# Patient Record
Sex: FEMALE | Race: WHITE | URBAN - METROPOLITAN AREA
[De-identification: names, ages, dates, MRNs, and addresses within clinical notes are randomized per-mention and may not be internally consistent; named-entity substitution may affect disease eponyms.]

---

## 2020-07-25 ENCOUNTER — TELEPHONE ENCOUNTER (OUTPATIENT)
Dept: URBAN - METROPOLITAN AREA CLINIC 13 | Facility: CLINIC | Age: 85
End: 2020-07-25

## 2020-07-26 ENCOUNTER — TELEPHONE ENCOUNTER (OUTPATIENT)
Dept: URBAN - METROPOLITAN AREA CLINIC 13 | Facility: CLINIC | Age: 85
End: 2020-07-26

## 2021-06-22 ENCOUNTER — WEB ENCOUNTER (OUTPATIENT)
Dept: URBAN - METROPOLITAN AREA CLINIC 113 | Facility: CLINIC | Age: 86
End: 2021-06-22

## 2021-06-22 ENCOUNTER — OFFICE VISIT (OUTPATIENT)
Dept: URBAN - METROPOLITAN AREA CLINIC 113 | Facility: CLINIC | Age: 86
End: 2021-06-22
Payer: MEDICARE

## 2021-06-22 VITALS
DIASTOLIC BLOOD PRESSURE: 65 MMHG | HEART RATE: 67 BPM | SYSTOLIC BLOOD PRESSURE: 131 MMHG | WEIGHT: 142 LBS | HEIGHT: 62 IN | TEMPERATURE: 97.8 F | BODY MASS INDEX: 26.13 KG/M2

## 2021-06-22 DIAGNOSIS — D64.9 ANEMIA, UNSPECIFIED TYPE: ICD-10-CM

## 2021-06-22 DIAGNOSIS — K59.01 SLOW TRANSIT CONSTIPATION: ICD-10-CM

## 2021-06-22 DIAGNOSIS — R15.9 INCONTINENCE OF FECES: ICD-10-CM

## 2021-06-22 PROBLEM — 72042002 INCONTINENCE OF FECES: Status: ACTIVE | Noted: 2021-06-22

## 2021-06-22 PROCEDURE — 99203 OFFICE O/P NEW LOW 30 MIN: CPT | Performed by: INTERNAL MEDICINE

## 2021-06-22 RX ORDER — HYDROXYUREA 500 MG/1
1 CAPSULE CAPSULE ORAL ONCE A DAY
Status: ACTIVE | COMMUNITY

## 2021-06-22 RX ORDER — LEVOTHYROXINE SODIUM 0.05 MG/1
1 TABLET IN THE MORNING ON AN EMPTY STOMACH TABLET ORAL ONCE A DAY
Status: DISCONTINUED | COMMUNITY

## 2021-06-22 RX ORDER — RASAGILINE 0.5 MG/1
1 TABLET TABLET ORAL ONCE A DAY
Status: ACTIVE | COMMUNITY

## 2021-06-22 RX ORDER — ASPIRIN 81 MG/1
1 TABLET TABLET ORAL ONCE A DAY
Status: ACTIVE | COMMUNITY

## 2021-06-22 RX ORDER — MELATONIN 5 MG
1 TABLET IN THE EVENING TABLET ORAL ONCE A DAY
Status: ACTIVE | COMMUNITY

## 2021-06-22 RX ORDER — LEVOTHYROXINE SODIUM 25 UG/1
1 TABLET 62.5 MG TABLET ORAL ONCE A DAY
Status: ACTIVE | COMMUNITY

## 2021-06-22 RX ORDER — NAPROXEN SODIUM 220 MG/1
1 TABLET WITH FOOD OR MILK AS NEEDED TABLET ORAL
Status: ACTIVE | COMMUNITY

## 2021-06-22 RX ORDER — CARBIDOPA AND LEVODOPA 25; 100 MG/1; MG/1
1 TABLET PRN TABLET ORAL THREE TIMES A DAY
Status: ACTIVE | COMMUNITY

## 2021-06-22 RX ORDER — OMEGA-3 FATTY ACIDS/FISH OIL 300-1000MG
1 CAPSULE CAPSULE ORAL ONCE A DAY
Status: ACTIVE | COMMUNITY

## 2021-06-22 NOTE — HPI-TODAY'S VISIT:
85-year-old female with a history of Grave's disease, colon polyps, chronic constipation, referred by Dr. Dean for evaluation of anemia. Unfortunately, recent labs are not available for review.  She complains of constipation, going up to one week without a bowel movement. Her stools will be hard, then progress to soft and loose with associated urgency and incontinence. She will have three bowel movements in one day, then go another week without. She denies associated abdominal pain, nausea/vomiting, or unintentional weight loss. No blood per rectum. She takes MiraLAX on occasion, with minimal response. Milk of magnesia is helpful, but she only takes it every few weeks. She complains of incomplete evacuation. She reports a normal colonoscopy in 2016 by Dr. Pearson at [facility]. She takes Aleve twice daily for management of arthritis pain, along with aspirin 81mg daily.

## 2021-06-28 ENCOUNTER — TELEPHONE ENCOUNTER (OUTPATIENT)
Dept: URBAN - METROPOLITAN AREA CLINIC 113 | Facility: CLINIC | Age: 86
End: 2021-06-28

## 2021-07-23 ENCOUNTER — TELEPHONE ENCOUNTER (OUTPATIENT)
Dept: URBAN - METROPOLITAN AREA CLINIC 113 | Facility: CLINIC | Age: 86
End: 2021-07-23

## 2021-08-28 ENCOUNTER — LAB OUTSIDE AN ENCOUNTER (OUTPATIENT)
Dept: URBAN - METROPOLITAN AREA CLINIC 113 | Facility: CLINIC | Age: 86
End: 2021-08-28

## 2021-09-10 ENCOUNTER — OFFICE VISIT (OUTPATIENT)
Dept: URBAN - METROPOLITAN AREA CLINIC 113 | Facility: CLINIC | Age: 86
End: 2021-09-10

## 2021-10-05 ENCOUNTER — OFFICE VISIT (OUTPATIENT)
Dept: URBAN - METROPOLITAN AREA CLINIC 113 | Facility: CLINIC | Age: 86
End: 2021-10-05
Payer: MEDICARE

## 2021-10-05 VITALS
BODY MASS INDEX: 26.31 KG/M2 | HEIGHT: 62 IN | TEMPERATURE: 97.8 F | WEIGHT: 143 LBS | SYSTOLIC BLOOD PRESSURE: 142 MMHG | HEART RATE: 67 BPM | DIASTOLIC BLOOD PRESSURE: 67 MMHG

## 2021-10-05 DIAGNOSIS — K59.01 SLOW TRANSIT CONSTIPATION: ICD-10-CM

## 2021-10-05 DIAGNOSIS — D53.1 MEGALOBLASTIC ANEMIA: ICD-10-CM

## 2021-10-05 PROBLEM — 35298007 SLOW TRANSIT CONSTIPATION: Status: ACTIVE | Noted: 2021-06-22

## 2021-10-05 PROCEDURE — 99214 OFFICE O/P EST MOD 30 MIN: CPT | Performed by: INTERNAL MEDICINE

## 2021-10-05 RX ORDER — MELATONIN 5 MG
1 TABLET IN THE EVENING TABLET ORAL ONCE A DAY
Status: ON HOLD | COMMUNITY

## 2021-10-05 RX ORDER — NAPROXEN SODIUM 220 MG/1
1 TABLET WITH FOOD OR MILK AS NEEDED TABLET ORAL
Status: ACTIVE | COMMUNITY

## 2021-10-05 RX ORDER — ASPIRIN 81 MG/1
1 TABLET TABLET ORAL ONCE A DAY
Status: ACTIVE | COMMUNITY

## 2021-10-05 RX ORDER — LINACLOTIDE 72 UG/1
1 CAPSULE AT LEAST 30 MINUTES BEFORE THE FIRST MEAL OF THE DAY ON AN EMPTY STOMACH CAPSULE, GELATIN COATED ORAL ONCE A DAY
Status: ACTIVE | COMMUNITY

## 2021-10-05 RX ORDER — OMEGA-3 FATTY ACIDS/FISH OIL 300-1000MG
1 CAPSULE CAPSULE ORAL ONCE A DAY
Status: ACTIVE | COMMUNITY

## 2021-10-05 RX ORDER — LEVOTHYROXINE SODIUM 25 UG/1
1 TABLET 62.5 MG TABLET ORAL ONCE A DAY
Status: ACTIVE | COMMUNITY

## 2021-10-05 RX ORDER — CARBIDOPA AND LEVODOPA 25; 100 MG/1; MG/1
1 TABLET PRN TABLET ORAL THREE TIMES A DAY
Status: ACTIVE | COMMUNITY

## 2021-10-05 RX ORDER — RASAGILINE 0.5 MG/1
1 TABLET TABLET ORAL ONCE A DAY
Status: ACTIVE | COMMUNITY

## 2021-10-05 RX ORDER — HYDROXYUREA 500 MG/1
1 CAPSULE CAPSULE ORAL ONCE A DAY
Status: ACTIVE | COMMUNITY

## 2021-10-05 NOTE — HPI-TODAY'S VISIT:
85-year-old female with a history of Grave's disease, colon polyps, chronic constipation, referred by Dr. Dean for evaluation of anemia. Unfortunately, recent labs are not available for review.  She complains of constipation, going up to one week without a bowel movement. Her stools will be hard, then progress to soft and loose with associated urgency and incontinence. She will have three bowel movements in one day, then go another week without. She denies associated abdominal pain, nausea/vomiting, or unintentional weight loss. No blood per rectum. She takes MiraLAX on occasion, with minimal response. Milk of magnesia is helpful, but she only takes it every few weeks. She complains of incomplete evacuation. She reports a normal colonoscopy in 2016 by Dr. Pearson at [facility]. She takes Aleve twice daily for management of arthritis pain, along with aspirin 81mg daily. Laboratory testing done by primary care physician in August of this year revealed a normal T4, normal magnesium, BUN 27 creatinine 1.  Normal LFTs.  TSH normal.  CBC showing white cell count 3.4 hemoglobin 11  platelets of 370.  MCV is improved from prior MCV of 130.  Patient is followed by hematology for this. The patient states that she could not tolerate milk of magnesia.  She is now taking Linzess with some modest improvement.  She is on 72 mg.  She is not currently on a fiber supplement.  She is concerned that she states her primary care physician was concerned about her anemia and whether she needed endoscopic evaluation for this.  She has no rectal bleeding or melena.  Her hemoglobin is 11 with an MCV running between 120 and 130.  She is routinely followed by hematology infection has an appointment with them next week.  He had no reported history of iron deficiency.

## 2022-11-03 ENCOUNTER — OFFICE VISIT (OUTPATIENT)
Dept: URBAN - METROPOLITAN AREA CLINIC 113 | Facility: CLINIC | Age: 87
End: 2022-11-03
Payer: MEDICARE

## 2022-11-03 VITALS
BODY MASS INDEX: 26.68 KG/M2 | SYSTOLIC BLOOD PRESSURE: 147 MMHG | RESPIRATION RATE: 18 BRPM | TEMPERATURE: 97.5 F | HEART RATE: 71 BPM | HEIGHT: 62 IN | DIASTOLIC BLOOD PRESSURE: 65 MMHG | WEIGHT: 145 LBS

## 2022-11-03 DIAGNOSIS — K59.09 CHRONIC CONSTIPATION WITH OVERFLOW INCONTINENCE: ICD-10-CM

## 2022-11-03 DIAGNOSIS — K76.0 HEPATIC STEATOSIS: ICD-10-CM

## 2022-11-03 DIAGNOSIS — R31.9 HEMATURIA, UNSPECIFIED TYPE: ICD-10-CM

## 2022-11-03 DIAGNOSIS — D53.9 MACROCYTIC ANEMIA: ICD-10-CM

## 2022-11-03 PROCEDURE — 99214 OFFICE O/P EST MOD 30 MIN: CPT

## 2022-11-03 RX ORDER — LEVOTHYROXINE SODIUM 25 UG/1
1 TABLET 62.5 MG TABLET ORAL ONCE A DAY
Status: ACTIVE | COMMUNITY

## 2022-11-03 RX ORDER — CARBIDOPA AND LEVODOPA 25; 100 MG/1; MG/1
1 TABLET PRN TABLET ORAL THREE TIMES A DAY
Status: ACTIVE | COMMUNITY

## 2022-11-03 RX ORDER — LORATADINE 10 MG
1 PACKET MIXED WITH 8 OUNCES OF FLUID TABLET,DISINTEGRATING ORAL ONCE A DAY
Status: ACTIVE | COMMUNITY

## 2022-11-03 RX ORDER — NAPROXEN SODIUM 220 MG/1
1 TABLET WITH FOOD OR MILK AS NEEDED TABLET ORAL
Status: ON HOLD | COMMUNITY

## 2022-11-03 RX ORDER — LINACLOTIDE 72 UG/1
1 CAPSULE AT LEAST 30 MINUTES BEFORE THE FIRST MEAL OF THE DAY ON AN EMPTY STOMACH CAPSULE, GELATIN COATED ORAL ONCE A DAY
Status: ON HOLD | COMMUNITY

## 2022-11-03 RX ORDER — OMEGA-3 FATTY ACIDS/FISH OIL 300-1000MG
1 CAPSULE CAPSULE ORAL ONCE A DAY
Status: ACTIVE | COMMUNITY

## 2022-11-03 RX ORDER — RASAGILINE 0.5 MG/1
1 TABLET TABLET ORAL ONCE A DAY
Status: ACTIVE | COMMUNITY

## 2022-11-03 RX ORDER — MELATONIN 5 MG
1 TABLET IN THE EVENING TABLET ORAL ONCE A DAY
Status: ON HOLD | COMMUNITY

## 2022-11-03 RX ORDER — ASPIRIN 81 MG/1
1 TABLET TABLET ORAL ONCE A DAY
Status: ACTIVE | COMMUNITY

## 2022-11-03 RX ORDER — HYDROXYUREA 500 MG/1
1 CAPSULE CAPSULE ORAL ONCE A DAY
Status: ACTIVE | COMMUNITY

## 2022-11-03 RX ORDER — POLYETHYLENE GLYCOL 3350, SODIUM CHLORIDE, SODIUM BICARBONATE, POTASSIUM CHLORIDE 420; 11.2; 5.72; 1.48 G/4L; G/4L; G/4L; G/4L
AS DIRECTED POWDER, FOR SOLUTION ORAL ONCE
Qty: 420 GM | Refills: 0 | OUTPATIENT
Start: 2022-11-03 | End: 2022-12-02

## 2022-11-03 NOTE — HPI-OTHER HISTORIES
Abdominal ultrasound 7/22/2022:Normal pancreas, gallbladder, kidneys, spleen.  Hepatic steatosis.  Labs 9/23/2022:Elevated vitamin D 104, normal LFTs, normal ferritin, normal iron studies, normal T3, normal T4, normal TSH, hemoglobin 10.7, hematocrit 32.4, elevated .6, normal CK, normal hemoglobin A1c, negative JENNIFER, normal GGT, negative ASMA, negative AMA, normal PTH, positive hepatitis A antibody total, negative hepatitis A antibody IgM, negative hepatitis B surface antigen, negative hepatitis B surface antibody, negative hepatitis B core antibody total, negative hepatitis C antibody.  CT scan of the abdomen/pelvis with contrast 10/4/2022:Mild atelectasis and scarring at the lung bases.  Normal liver, gallbladder, pancreas, spleen.  No renal or ureteral stone.  No hydronephrosis.  Eccentric bladder wall thickening on the left measuring 0.8 cm.  Direct visualization recommended to evaluate for possible bladder mass given history of hematuria.  Normal colonoscopy by Dr. Cancino 11/13/2009.

## 2022-11-03 NOTE — HPI-TODAY'S VISIT:
85-year-old female with a history of Grave's disease, colon polyps, chronic constipation, referred by Dr. Dean for evaluation of anemia. Unfortunately, recent labs are not available for review.  She complains of constipation, going up to one week without a bowel movement. Her stools will be hard, then progress to soft and loose with associated urgency and incontinence. She will have three bowel movements in one day, then go another week without. She denies associated abdominal pain, nausea/vomiting, or unintentional weight loss. No blood per rectum. She takes MiraLAX on occasion, with minimal response. Milk of magnesia is helpful, but she only takes it every few weeks. She complains of incomplete evacuation. She reports a normal colonoscopy in 2016 by Dr. Pearson at [facility]. She takes Aleve twice daily for management of arthritis pain, along with aspirin 81 mg daily. Laboratory testing done by primary care physician in August of this year revealed a normal T4, normal magnesium, BUN 27 creatinine 1.  Normal LFTs.  TSH normal.  CBC showing white cell count 3.4 hemoglobin 11  platelets of 370.  MCV is improved from prior MCV of 130.  Patient is followed by hematology for this. The patient states that she could not tolerate milk of magnesia.  She is now taking Linzess with some modest improvement.  She is on 72 mg.  She is not currently on a fiber supplement.  She is concerned that she states her primary care physician was concerned about her anemia and whether she needed endoscopic evaluation for this.  She has no rectal bleeding or melena.  Her hemoglobin is 11 with an MCV running between 120 and 130.  She is routinely followed by hematology infection has an appointment with them next week.  He had no reported history of iron deficiency.  Interval history: 86-year-old female presents for long interval follow-up and evaluation of change in bowel habits.  She was last seen on 10/5/2021.  She was recommended to titrate MiraLAX and add a fiber supplement to her daily Linzess use for management of constipation.  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  If there is evidence of iron deficiency anemia we would consider EGD and colonoscopy however in view of her age, would prefer more definitive evidence for chronic GI blood loss.  Her hemoglobin was largely stable at 11.  If she had decreasing hemoglobin or other GI signs such as melena or hematochezia or symptoms of iron deficiency we would then need to proceed with endoscopic evaluation.  We would continue her routine follow-up and monitor information sent by her hematologist and PCP looking for signs and symptoms of chronic GI blood loss. We do not have any updated labs since last visit.  She states her bowel habits were fairly controlled with Linzess however the secondary diarrhea became too bothersome. She would have urgent diarrhea while out in public. She was taken off of Linzess by her PCP and started on MiraLAX and Colace twice daily. This was working well for some time however her bowels have reverted to her prior state. She will go a week without a bowel movement then she will have a day of diarrhea. She describes this as a day of several soft bowel movements throughout the day. She had to consume prunes last week to have a bowel movement. Her last bowel movement was over the last weekend. SHe does have Parkinson disease. She fell a few wee ska ago due to her issues with balance. She has a large bruise on the left side of her face. She had no damage to her vision.   She denies rectal bleeding though she has had hematuria. She was referred to Dr. Doshi, urology in [place]. She had a cystoscopy which was unremarkable. She has now been referred to gynecology. She does follow with a cardiologist as well. Recent cardiac evaluation was unremarkable. She is followed by Dr Carty who monitors her anemia.   She is otherwise doing well from a GI standpoint. SHe denies heartburn, dysphagia, nausea, vomiting or abdominal pain. Denies unintentional weight loss and appetite is normal.

## 2022-11-09 ENCOUNTER — TELEPHONE ENCOUNTER (OUTPATIENT)
Dept: URBAN - METROPOLITAN AREA CLINIC 113 | Facility: CLINIC | Age: 87
End: 2022-11-09

## 2022-12-30 ENCOUNTER — WEB ENCOUNTER (OUTPATIENT)
Dept: URBAN - METROPOLITAN AREA CLINIC 113 | Facility: CLINIC | Age: 87
End: 2022-12-30

## 2023-01-05 ENCOUNTER — DASHBOARD ENCOUNTERS (OUTPATIENT)
Age: 88
End: 2023-01-05

## 2023-01-05 ENCOUNTER — OFFICE VISIT (OUTPATIENT)
Dept: URBAN - METROPOLITAN AREA CLINIC 113 | Facility: CLINIC | Age: 88
End: 2023-01-05
Payer: MEDICARE

## 2023-01-05 VITALS
BODY MASS INDEX: 26.87 KG/M2 | WEIGHT: 146 LBS | HEART RATE: 71 BPM | SYSTOLIC BLOOD PRESSURE: 130 MMHG | RESPIRATION RATE: 18 BRPM | HEIGHT: 62 IN | DIASTOLIC BLOOD PRESSURE: 68 MMHG | TEMPERATURE: 97.5 F

## 2023-01-05 DIAGNOSIS — R31.9 HEMATURIA, UNSPECIFIED TYPE: ICD-10-CM

## 2023-01-05 DIAGNOSIS — K59.09 CHRONIC CONSTIPATION WITH OVERFLOW INCONTINENCE: ICD-10-CM

## 2023-01-05 DIAGNOSIS — K76.0 HEPATIC STEATOSIS: ICD-10-CM

## 2023-01-05 DIAGNOSIS — D53.9 MACROCYTIC ANEMIA: ICD-10-CM

## 2023-01-05 DIAGNOSIS — N93.9 VAGINAL SPOTTING: ICD-10-CM

## 2023-01-05 PROBLEM — 289530006: Status: ACTIVE | Noted: 2023-01-05

## 2023-01-05 PROBLEM — 197321007: Status: ACTIVE | Noted: 2022-11-03

## 2023-01-05 PROCEDURE — 99214 OFFICE O/P EST MOD 30 MIN: CPT

## 2023-01-05 RX ORDER — CARBIDOPA AND LEVODOPA 25; 100 MG/1; MG/1
1 TABLET PRN TABLET ORAL THREE TIMES A DAY
Status: ACTIVE | COMMUNITY

## 2023-01-05 RX ORDER — LINACLOTIDE 72 UG/1
1 CAPSULE AT LEAST 30 MINUTES BEFORE THE FIRST MEAL OF THE DAY ON AN EMPTY STOMACH CAPSULE, GELATIN COATED ORAL ONCE A DAY
Status: ON HOLD | COMMUNITY

## 2023-01-05 RX ORDER — RASAGILINE 0.5 MG/1
1 TABLET TABLET ORAL ONCE A DAY
Status: ACTIVE | COMMUNITY

## 2023-01-05 RX ORDER — HYDROXYUREA 500 MG/1
1 CAPSULE CAPSULE ORAL ONCE A DAY
Status: ACTIVE | COMMUNITY

## 2023-01-05 RX ORDER — ASPIRIN 81 MG/1
1 TABLET TABLET ORAL ONCE A DAY
Status: ACTIVE | COMMUNITY

## 2023-01-05 RX ORDER — MELATONIN 5 MG
1 TABLET IN THE EVENING TABLET ORAL ONCE A DAY
Status: ON HOLD | COMMUNITY

## 2023-01-05 RX ORDER — OMEGA-3 FATTY ACIDS/FISH OIL 300-1000MG
1 CAPSULE CAPSULE ORAL ONCE A DAY
Status: ACTIVE | COMMUNITY

## 2023-01-05 RX ORDER — LEVOTHYROXINE SODIUM 25 UG/1
1 TABLET 62.5 MG TABLET ORAL ONCE A DAY
Status: ACTIVE | COMMUNITY

## 2023-01-05 RX ORDER — NAPROXEN SODIUM 220 MG/1
1 TABLET WITH FOOD OR MILK AS NEEDED TABLET ORAL
Status: ON HOLD | COMMUNITY

## 2023-01-05 RX ORDER — LORATADINE 10 MG
1 PACKET MIXED WITH 8 OUNCES OF FLUID TABLET,DISINTEGRATING ORAL ONCE A DAY
Status: ACTIVE | COMMUNITY

## 2023-01-05 NOTE — HPI-TODAY'S VISIT:
85-year-old female with a history of Grave's disease, colon polyps, chronic constipation, referred by Dr. Dean for evaluation of anemia. Unfortunately, recent labs are not available for review.  She complains of constipation, going up to one week without a bowel movement. Her stools will be hard, then progress to soft and loose with associated urgency and incontinence. She will have three bowel movements in one day, then go another week without. She denies associated abdominal pain, nausea/vomiting, or unintentional weight loss. No blood per rectum. She takes MiraLAX on occasion, with minimal response. Milk of magnesia is helpful, but she only takes it every few weeks. She complains of incomplete evacuation. She reports a normal colonoscopy in 2016 by Dr. Pearson at [facility]. She takes Aleve twice daily for management of arthritis pain, along with aspirin 81 mg daily. Laboratory testing done by primary care physician in August of this year revealed a normal T4, normal magnesium, BUN 27 creatinine 1.  Normal LFTs.  TSH normal.  CBC showing white cell count 3.4 hemoglobin 11  platelets of 370.  MCV is improved from prior MCV of 130.  Patient is followed by hematology for this. The patient states that she could not tolerate milk of magnesia.  She is now taking Linzess with some modest improvement.  She is on 72 mg.  She is not currently on a fiber supplement.  She is concerned that she states her primary care physician was concerned about her anemia and whether she needed endoscopic evaluation for this.  She has no rectal bleeding or melena.  Her hemoglobin is 11 with an MCV running between 120 and 130.  She is routinely followed by hematology infection has an appointment with them next week.  He had no reported history of iron deficiency.  Interval history: 86-year-old female presents for long interval follow-up and evaluation of change in bowel habits.  She was last seen on 10/5/2021.  She was recommended to titrate MiraLAX and add a fiber supplement to her daily Linzess use for management of constipation.  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  If there is evidence of iron deficiency anemia we would consider EGD and colonoscopy however in view of her age, would prefer more definitive evidence for chronic GI blood loss.  Her hemoglobin was largely stable at 11.  If she had decreasing hemoglobin or other GI signs such as melena or hematochezia or symptoms of iron deficiency we would then need to proceed with endoscopic evaluation.  We would continue her routine follow-up and monitor information sent by her hematologist and PCP looking for signs and symptoms of chronic GI blood loss. We do not have any updated labs since last visit.  She states her bowel habits were fairly controlled with Linzess however the secondary diarrhea became too bothersome. She would have urgent diarrhea while out in public. She was taken off of Linzess by her PCP and started on MiraLAX and Colace twice daily. This was working well for some time however her bowels have reverted to her prior state. She will go a week without a bowel movement then she will have a day of diarrhea. She describes this as a day of several soft bowel movements throughout the day. She had to consume prunes last week to have a bowel movement. Her last bowel movement was over the last weekend. SHe does have Parkinson disease. She fell a few wee ska ago due to her issues with balance. She has a large bruise on the left side of her face. She had no damage to her vision.   She denies rectal bleeding though she has had hematuria. She was referred to Dr. Doshi, urology in [place]. She had a cystoscopy which was unremarkable. She has now been referred to gynecology. She does follow with a cardiologist as well. Recent cardiac evaluation was unremarkable. She is followed by Dr Carty who monitors her anemia.   She is otherwise doing well from a GI standpoint. SHe denies heartburn, dysphagia, nausea, vomiting or abdominal pain. Denies unintentional weight loss and appetite is normal.  Interval history: 87-year-old female presents for follow-up.  She was last seen on 11/3/2022.  She does have chronic constipation with intermittent overflow diarrhea.  She was recommended a bowel cleanse followed by a trial of Trulance.  Samples were provided.  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 Hernando [place].  Recent iron studies were normal and there were no overt signs of GI blood loss.  She did have a pertinent history of hematuria and bladder thickening noted on recent CT scan though cystoscopy was unremarkable.  She was recommended to follow-up with gynecology next.  Patient contacted the office on 11/9 stating Trulance caused headaches, insomnia and elevated blood pressure.  Though she did state the Trulance worked "too well".  She wished to retrial milk of magnesia and declined any further medications.  Labs 1/3/2023: normal T3/T4, normal TSH, low WBC 4.4, low Hgb 10.4, low HCt 30.4, elevated .8, elevated plts 412.  She restarted MOM which causes nausea. She is taking 45 ml of the liquid suspension. SHe is not currently on a bowel regimen. She has had benefit from prune juice in the past. She has a bowel movement every 2-3 days without straining. She did a Linzess one night with no effect. She had a pap smear done following her abnormal CT. This was notable for atypical cells. She was also fount to have a prolapsed uterus. SHe is to repeat a pap smear in a year. She does still have vaginal spotting occasionally. She describes this as tea colored urine and small blood clots.

## 2023-04-07 ENCOUNTER — OFFICE VISIT (OUTPATIENT)
Dept: URBAN - METROPOLITAN AREA CLINIC 113 | Facility: CLINIC | Age: 88
End: 2023-04-07